# Patient Record
Sex: MALE | Race: WHITE | NOT HISPANIC OR LATINO | Employment: OTHER | URBAN - METROPOLITAN AREA
[De-identification: names, ages, dates, MRNs, and addresses within clinical notes are randomized per-mention and may not be internally consistent; named-entity substitution may affect disease eponyms.]

---

## 2020-08-08 ENCOUNTER — HOSPITAL ENCOUNTER (EMERGENCY)
Facility: HOSPITAL | Age: 80
Discharge: HOME | End: 2020-08-08
Attending: EMERGENCY MEDICINE | Admitting: EMERGENCY MEDICINE
Payer: COMMERCIAL

## 2020-08-08 VITALS
SYSTOLIC BLOOD PRESSURE: 187 MMHG | HEART RATE: 74 BPM | RESPIRATION RATE: 18 BRPM | OXYGEN SATURATION: 99 % | TEMPERATURE: 97.2 F | DIASTOLIC BLOOD PRESSURE: 86 MMHG

## 2020-08-08 DIAGNOSIS — I10 ASYMPTOMATIC HYPERTENSION: Primary | ICD-10-CM

## 2020-08-08 LAB
ANION GAP SERPL CALCULATED.3IONS-SCNC: 5 MMOL/L (ref 4–13)
BASOPHILS # BLD AUTO: 0.03 THOUSANDS/ΜL (ref 0–0.1)
BASOPHILS NFR BLD AUTO: 0 % (ref 0–1)
BUN SERPL-MCNC: 19 MG/DL (ref 5–25)
CALCIUM SERPL-MCNC: 9.4 MG/DL (ref 8.3–10.1)
CHLORIDE SERPL-SCNC: 106 MMOL/L (ref 100–108)
CO2 SERPL-SCNC: 32 MMOL/L (ref 21–32)
CREAT SERPL-MCNC: 1.31 MG/DL (ref 0.6–1.3)
EOSINOPHIL # BLD AUTO: 0.18 THOUSAND/ΜL (ref 0–0.61)
EOSINOPHIL NFR BLD AUTO: 2 % (ref 0–6)
ERYTHROCYTE [DISTWIDTH] IN BLOOD BY AUTOMATED COUNT: 13.2 % (ref 11.6–15.1)
GFR SERPL CREATININE-BSD FRML MDRD: 51 ML/MIN/1.73SQ M
GLUCOSE SERPL-MCNC: 125 MG/DL (ref 65–140)
HCT VFR BLD AUTO: 48 % (ref 36.5–49.3)
HGB BLD-MCNC: 15.8 G/DL (ref 12–17)
IMM GRANULOCYTES # BLD AUTO: 0.05 THOUSAND/UL (ref 0–0.2)
IMM GRANULOCYTES NFR BLD AUTO: 1 % (ref 0–2)
LYMPHOCYTES # BLD AUTO: 3.2 THOUSANDS/ΜL (ref 0.6–4.47)
LYMPHOCYTES NFR BLD AUTO: 36 % (ref 14–44)
MCH RBC QN AUTO: 30.7 PG (ref 26.8–34.3)
MCHC RBC AUTO-ENTMCNC: 32.9 G/DL (ref 31.4–37.4)
MCV RBC AUTO: 93 FL (ref 82–98)
MONOCYTES # BLD AUTO: 0.69 THOUSAND/ΜL (ref 0.17–1.22)
MONOCYTES NFR BLD AUTO: 8 % (ref 4–12)
NEUTROPHILS # BLD AUTO: 4.83 THOUSANDS/ΜL (ref 1.85–7.62)
NEUTS SEG NFR BLD AUTO: 53 % (ref 43–75)
NRBC BLD AUTO-RTO: 0 /100 WBCS
PLATELET # BLD AUTO: 189 THOUSANDS/UL (ref 149–390)
PMV BLD AUTO: 9.6 FL (ref 8.9–12.7)
POTASSIUM SERPL-SCNC: 3.9 MMOL/L (ref 3.5–5.3)
RBC # BLD AUTO: 5.14 MILLION/UL (ref 3.88–5.62)
SODIUM SERPL-SCNC: 143 MMOL/L (ref 136–145)
TROPONIN I SERPL-MCNC: <0.02 NG/ML
WBC # BLD AUTO: 8.98 THOUSAND/UL (ref 4.31–10.16)

## 2020-08-08 PROCEDURE — 80048 BASIC METABOLIC PNL TOTAL CA: CPT | Performed by: EMERGENCY MEDICINE

## 2020-08-08 PROCEDURE — 36415 COLL VENOUS BLD VENIPUNCTURE: CPT | Performed by: EMERGENCY MEDICINE

## 2020-08-08 PROCEDURE — 84484 ASSAY OF TROPONIN QUANT: CPT | Performed by: EMERGENCY MEDICINE

## 2020-08-08 PROCEDURE — 99285 EMERGENCY DEPT VISIT HI MDM: CPT | Performed by: EMERGENCY MEDICINE

## 2020-08-08 PROCEDURE — 93005 ELECTROCARDIOGRAM TRACING: CPT

## 2020-08-08 PROCEDURE — 85025 COMPLETE CBC W/AUTO DIFF WBC: CPT | Performed by: EMERGENCY MEDICINE

## 2020-08-08 PROCEDURE — 99283 EMERGENCY DEPT VISIT LOW MDM: CPT

## 2020-08-08 RX ORDER — AMLODIPINE BESYLATE 5 MG/1
5 TABLET ORAL DAILY
Qty: 30 TABLET | Refills: 0 | Status: SHIPPED | OUTPATIENT
Start: 2020-08-08 | End: 2020-09-07

## 2020-08-09 LAB
ATRIAL RATE: 62 BPM
P AXIS: 71 DEGREES
PR INTERVAL: 250 MS
QRS AXIS: -1 DEGREES
QRSD INTERVAL: 98 MS
QT INTERVAL: 426 MS
QTC INTERVAL: 432 MS
T WAVE AXIS: 53 DEGREES
VENTRICULAR RATE: 62 BPM

## 2020-08-09 PROCEDURE — 93010 ELECTROCARDIOGRAM REPORT: CPT | Performed by: INTERNAL MEDICINE

## 2020-08-09 NOTE — ED PROVIDER NOTES
History  Chief Complaint   Patient presents with    Hypertension     Patient c/o HTN x 1 week  Denies dizziness, nausea, or blurred vision  Per patient, " I got concerned when I saw 180/95" Patient has hx of HTN  HPI     77-year-old male with history of B-cell lymphoma in remission, hypertension on 10 mg of HCTZ daily, who presents for evaluation of hypertension with blood pressures measuring at 190/90 at home  The patient states that for the last 6 months he has had blood pressure is elevated at 690 to 258 systolic  Over the last couple of weeks he has noticed that his blood pressures have been elevated as high as 303 systolic  Tonight he noticed that his blood pressure was that high for multiple readings, so decided to come in for evaluation  He denies headache, vision changes, dizziness, nausea, or vomiting  No chest pain, shortness of breath, or decreased urine output  States he feels at baseline and has no physical complaints  None       Past Medical History:   Diagnosis Date    Hypertension     Lymphoma ([facility] [address] )        Past Surgical History:   Procedure Laterality Date    HERNIA REPAIR      NECK SURGERY         History reviewed  No pertinent family history  I have reviewed and agree with the history as documented  E-Cigarette/Vaping     E-Cigarette/Vaping Substances    Nicotine No     THC No     CBD No     Flavoring No     Other No     Unknown No      Social History     Tobacco Use    Smoking status: Never Smoker    Smokeless tobacco: Never Used   Substance Use Topics    Alcohol use: Yes     Frequency: 4 or more times a week     Drinks per session: 1 or 2     Binge frequency: Never    Drug use: Never       Review of Systems   Constitutional: Negative for chills and fever  HENT: Negative for congestion  Eyes: Negative for visual disturbance  Respiratory: Negative for cough and shortness of breath  Cardiovascular: Negative for chest pain and leg swelling  Gastrointestinal: Negative for abdominal pain, diarrhea, nausea and vomiting  Genitourinary: Negative for dysuria and frequency  Musculoskeletal: Negative for arthralgias, back pain, neck pain and neck stiffness  Skin: Negative for rash  Neurological: Negative for weakness, numbness and headaches  Psychiatric/Behavioral: Negative for agitation, behavioral problems and confusion  Physical Exam  Physical Exam  Constitutional:       General: He is not in acute distress  Appearance: He is well-developed  He is not diaphoretic  HENT:      Head: Normocephalic and atraumatic  Right Ear: External ear normal       Left Ear: External ear normal       Nose: Nose normal    Eyes:      Extraocular Movements: Extraocular movements intact  Conjunctiva/sclera: Conjunctivae normal       Pupils: Pupils are equal, round, and reactive to light  Neck:      Musculoskeletal: Normal range of motion and neck supple  Cardiovascular:      Rate and Rhythm: Normal rate and regular rhythm  Heart sounds: Normal heart sounds  No murmur  No friction rub  No gallop  Pulmonary:      Effort: Pulmonary effort is normal  No respiratory distress  Breath sounds: Normal breath sounds  No wheezing or rales  Abdominal:      General: Bowel sounds are normal  There is no distension  Palpations: Abdomen is soft  Tenderness: There is no abdominal tenderness  There is no guarding  Musculoskeletal: Normal range of motion  General: No deformity  Right lower leg: No edema  Left lower leg: No edema  Skin:     General: Skin is warm and dry  Neurological:      Mental Status: He is alert and oriented to person, place, and time  Motor: No abnormal muscle tone           Vital Signs  ED Triage Vitals   Temperature Pulse Respirations Blood Pressure SpO2   08/08/20 2041 08/08/20 2041 08/08/20 2041 08/08/20 2041 08/08/20 2041   (!) 97 2 °F (36 2 °C) 94 18 (!) 202/94 98 %      Temp Source Heart Rate Source Patient Position - Orthostatic VS BP Location FiO2 (%)   08/08/20 2041 08/08/20 2041 08/08/20 2228 08/08/20 2228 --   Oral Monitor Sitting Left arm       Pain Score       --                  Vitals:    08/08/20 2041 08/08/20 2106 08/08/20 2228   BP: (!) 202/94 (!) 184/70 (!) 187/86   Pulse: 94  74   Patient Position - Orthostatic VS:   Sitting         Visual Acuity      ED Medications  Medications - No data to display    Diagnostic Studies  Results Reviewed     Procedure Component Value Units Date/Time    Troponin I [[id number]]  (Normal) Collected:  08/08/20 2128    Lab Status:  Final result Specimen:  Blood from Arm, Right Updated:  08/08/20 2154     Troponin I <0 02 ng/mL     Basic metabolic panel [[id number]]  (Abnormal) Collected:  08/08/20 2128    Lab Status:  Final result Specimen:  Blood from Arm, Right Updated:  08/08/20 2145     Sodium 143 mmol/L      Potassium 3 9 mmol/L      Chloride 106 mmol/L      CO2 32 mmol/L      ANION GAP 5 mmol/L      BUN 19 mg/dL      Creatinine 1 31 mg/dL      Glucose 125 mg/dL      Calcium 9 4 mg/dL      eGFR 51 ml/min/1 73sq m     Narrative:       [place] guidelines for Chronic Kidney Disease (CKD):     Stage 1 with normal or high GFR (GFR > 90 mL/min/1 73 square meters)    Stage 2 Mild CKD (GFR = 60-89 mL/min/1 73 square meters)    Stage 3A Moderate CKD (GFR = 45-59 mL/min/1 73 square meters)    Stage 3B Moderate CKD (GFR = 30-44 mL/min/1 73 square meters)    Stage 4 Severe CKD (GFR = 15-29 mL/min/1 73 square meters)    Stage 5 End Stage CKD (GFR <15 mL/min/1 73 square meters)  Note: GFR calculation is accurate only with a steady state creatinine    CBC and differential [[id number]] Collected:  08/08/20 2128    Lab Status:  Final result Specimen:  Blood from Arm, Right Updated:  08/08/20 2135     WBC 8 98 Thousand/uL      RBC 5 14 Million/uL      Hemoglobin 15 8 g/dL      Hematocrit 48 0 %      MCV 93 fL      MCH 30 7 pg MCHC 32 9 g/dL      RDW 13 2 %      MPV 9 6 fL      Platelets 649 Thousands/uL      nRBC 0 /100 WBCs      Neutrophils Relative 53 %      Immat GRANS % 1 %      Lymphocytes Relative 36 %      Monocytes Relative 8 %      Eosinophils Relative 2 %      Basophils Relative 0 %      Neutrophils Absolute 4 83 Thousands/µL      Immature Grans Absolute 0 05 Thousand/uL      Lymphocytes Absolute 3 20 Thousands/µL      Monocytes Absolute 0 69 Thousand/µL      Eosinophils Absolute 0 18 Thousand/µL      Basophils Absolute 0 03 Thousands/µL                  No orders to display              Procedures  Procedures         ED Course                                             MDM  Number of Diagnoses or Management Options  Asymptomatic hypertension: new and requires workup  Diagnosis management comments: Generally well appearing  Afebrile and hemodynamically stable though hypertensive to 202/94 on arrival   Patient denies headache, vision changes, chest pain, or decreased urination  CBC unremarkable  BMP with creatinine of 1 3 from 1 2 previously  I personally interpreted the patient's EKG, which reveals normal rate, sinus rhythm with first-degree AV block, normal intervals, Q-wave with nonspecific T-wave abnormality in leads V1 and V2, Q-wave in lead V3  There is no prior available for comparison, however report of prior EKGs in care everywhere suggest similar prior EKGs  Troponin obtained which is undetectable  Patient denies chest pain  No evidence of end-organ damage by history or labs to suggest hypertensive crisis  Suspect asymptomatic hypertension  Given renal function, will not increase dose of hydrochlorothiazide, but will add 5 mg of amlodipine  Patient has a PCP that he can follow up with in 1-2 weeks for repeat blood pressure check and to adjust meds as indicated  BP improved to 187/86 prior to discharge without intervention       Return precautions discussed, and patient discharged in good condition  Amount and/or Complexity of Data Reviewed  Clinical lab tests: ordered and reviewed  Review and summarize past medical records: yes  Independent visualization of images, tracings, or specimens: yes    Patient Progress  Patient progress: stable      Disposition  Final diagnoses:   Asymptomatic hypertension     Time reflects when diagnosis was documented in both MDM as applicable and the Disposition within this note     Time User Action Codes Description Comment    8/8/2020 10:15 PM Steven Monterroso Add [I10] Asymptomatic hypertension       ED Disposition     ED Disposition Condition Date/Time Comment    Discharge Stable Sat Aug 8, 2020 10:15 PM Mike Villasenor discharge to home/self care  Follow-up Information     Follow up With Specialties Details Why Contact Info Additional Information    Your primary care physician  In 1 week For blood pressure recheck, and for adjustment of your medications if necessary  [address] [place] [address] Emergency Department Emergency Medicine  As we discussed, return to the Emergency Department immediately for chest pain, difficulty breathing, dizziness, or decreased urination  [address] [facility] [id number]  [address] [place] [address], [address] [place], [place], [address]          Discharge Medication List as of 8/8/2020 10:17 PM      START taking these medications    Details   amLODIPine (NORVASC) 5 mg tablet Take 1 tablet (5 mg total) by mouth daily, Starting Sat 8/8/2020, Until Mon 9/7/2020, Print           No discharge procedures on file      PDMP Review     None          ED Provider  Electronically Signed by           Mello Campbell MD  08/08/20 8672